# Patient Record
Sex: MALE | Race: BLACK OR AFRICAN AMERICAN | NOT HISPANIC OR LATINO | URBAN - METROPOLITAN AREA
[De-identification: names, ages, dates, MRNs, and addresses within clinical notes are randomized per-mention and may not be internally consistent; named-entity substitution may affect disease eponyms.]

---

## 2021-10-20 ENCOUNTER — OFFICE VISIT (OUTPATIENT)
Dept: URBAN - METROPOLITAN AREA CLINIC 27 | Facility: CLINIC | Age: 46
End: 2021-10-20

## 2021-10-20 PROBLEM — 54586004 LOWER ABDOMINAL PAIN: Status: ACTIVE | Noted: 2021-10-20

## 2021-10-20 PROBLEM — 266464001 HEMORRHAGE OF RECTUM AND ANUS: Status: ACTIVE | Noted: 2021-10-20

## 2021-10-20 PROBLEM — 275978004 SCREENING FOR MALIGNANT NEOPLASM OF COLON: Status: ACTIVE | Noted: 2021-10-20

## 2021-10-20 PROBLEM — 266435005 GASTRO-ESOPHAGEAL REFLUX DISEASE WITHOUT ESOPHAGITIS: Status: ACTIVE | Noted: 2021-10-20

## 2021-10-21 ENCOUNTER — LAB OUTSIDE AN ENCOUNTER (OUTPATIENT)
Dept: URBAN - METROPOLITAN AREA CLINIC 121 | Facility: CLINIC | Age: 46
End: 2021-10-21

## 2021-10-21 LAB
A/G RATIO: (no result)
ALBUMIN: 4.3
ALK PHOS: 132
AMYLASE: 30
BASOPH COUNT: (no result)
BASOPHIL %: 0.7
BG RANDOM: 85
BILI TOTAL: 0.4
BUN/CREAT: (no result)
BUN: 19
CALCIUM: 9.5
CHLORIDE: 103
CO2: 29
CREATININE: 0.92
EOS COUNT: (no result)
EOSINOPHIL %: 4.9
GLOBULIN TOT: (no result)
HCT: 40.7
HGB: 13.5
HGBA1C: (no result)
LYMPHS %: 26
MCH: 30.1
MCHC: (no result)
MCV: 90.8
MONOCYTE %: 10
MONOSCT AUTO: (no result)
PLATELETS: (no result)
PMN %: 58.4
POTASSIUM: 4.5
PROTEIN, TOT: 6.9
RBC: (no result)
RDW: 13.2
SGOT (AST): 20
SGPT (ALT): 20
WBC: (no result)
ZZ-GE-UNK: (no result)
ZZ-GE-UNK: (no result)

## 2022-02-25 ENCOUNTER — OFFICE VISIT (OUTPATIENT)
Dept: URBAN - METROPOLITAN AREA SURGERY CENTER 7 | Facility: SURGERY CENTER | Age: 47
End: 2022-02-25

## 2022-04-01 ENCOUNTER — OFFICE VISIT (OUTPATIENT)
Dept: URBAN - METROPOLITAN AREA CLINIC 27 | Facility: CLINIC | Age: 47
End: 2022-04-01

## 2022-04-01 PROBLEM — 80774000 HELICOBACTER PYLORI: Status: ACTIVE | Noted: 2022-04-01

## 2022-04-01 PROBLEM — 14760008 CONSTIPATION: Status: ACTIVE | Noted: 2022-04-01

## 2022-04-03 ENCOUNTER — OFFICE VISIT (OUTPATIENT)
Dept: URBAN - METROPOLITAN AREA CLINIC 121 | Facility: CLINIC | Age: 47
End: 2022-04-03

## 2022-04-30 ENCOUNTER — TELEPHONE ENCOUNTER (OUTPATIENT)
Dept: URBAN - METROPOLITAN AREA CLINIC 121 | Facility: CLINIC | Age: 47
End: 2022-04-30

## 2022-04-30 RX ORDER — OMEPRAZOLE MAGNESIUM, AMOXICILLIN AND RIFABUTIN 10; 250; 12.5 MG/1; MG/1; MG/1
TAKE 4 CAPSULE BY MOUTH EVERY EIGHT HOURS WITH MEALS CAPSULE, DELAYED RELEASE ORAL
OUTPATIENT
Start: 2022-02-25 | End: 2022-03-11

## 2022-04-30 RX ORDER — OMEPRAZOLE MAGNESIUM, AMOXICILLIN AND RIFABUTIN 10; 250; 12.5 MG/1; MG/1; MG/1
TAKE 4 CAPSULE BY MOUTH EVERY EIGHT HOURS CAPSULE, DELAYED RELEASE ORAL
OUTPATIENT
Start: 2021-10-21 | End: 2021-11-04

## 2022-04-30 RX ORDER — POLYETHYLENE GLYCOL-3350 AND ELECTROLYTES 236; 6.74; 5.86; 2.97; 22.74 G/274.31G; G/274.31G; G/274.31G; G/274.31G; G/274.31G
TAKE BY MOUTH AS DIRECTED MIX AND USE AS DIRECTED. CAN BE SUBSTITUTED WITH GAVILYTE-C, GAVILYTE-N, TRILYTE, OR GENERIC IF NEEDED POWDER, FOR SOLUTION ORAL
OUTPATIENT
Start: 2021-10-20 | End: 2021-10-21

## 2022-04-30 RX ORDER — HYOSCYAMINE SULFATE 0.12 MG/1
TAKE 1-2 TABLET BY MOUTH EVERY SIX HOURS AS NEEDED FOR ABDOMINAL PAIN TABLET ORAL
OUTPATIENT
Start: 2021-10-20 | End: 2022-02-17

## 2022-05-01 ENCOUNTER — TELEPHONE ENCOUNTER (OUTPATIENT)
Dept: URBAN - METROPOLITAN AREA CLINIC 121 | Facility: CLINIC | Age: 47
End: 2022-05-01

## 2023-03-07 ENCOUNTER — TELEPHONE ENCOUNTER (OUTPATIENT)
Dept: URBAN - METROPOLITAN AREA CLINIC 27 | Facility: CLINIC | Age: 48
End: 2023-03-07

## 2023-03-15 ENCOUNTER — TELEPHONE ENCOUNTER (OUTPATIENT)
Dept: URBAN - METROPOLITAN AREA CLINIC 27 | Facility: CLINIC | Age: 48
End: 2023-03-15

## 2023-03-16 ENCOUNTER — TELEPHONE ENCOUNTER (OUTPATIENT)
Dept: URBAN - METROPOLITAN AREA CLINIC 27 | Facility: CLINIC | Age: 48
End: 2023-03-16

## 2023-04-14 ENCOUNTER — DASHBOARD ENCOUNTERS (OUTPATIENT)
Age: 48
End: 2023-04-14

## 2023-04-14 ENCOUNTER — OFFICE VISIT (OUTPATIENT)
Dept: URBAN - METROPOLITAN AREA CLINIC 27 | Facility: CLINIC | Age: 48
End: 2023-04-14
Payer: OTHER GOVERNMENT

## 2023-04-14 ENCOUNTER — WEB ENCOUNTER (OUTPATIENT)
Dept: URBAN - METROPOLITAN AREA CLINIC 27 | Facility: CLINIC | Age: 48
End: 2023-04-14

## 2023-04-14 VITALS
HEIGHT: 72 IN | WEIGHT: 201 LBS | BODY MASS INDEX: 27.22 KG/M2 | SYSTOLIC BLOOD PRESSURE: 124 MMHG | DIASTOLIC BLOOD PRESSURE: 70 MMHG | HEART RATE: 73 BPM

## 2023-04-14 DIAGNOSIS — R13.10 DYSPHAGIA: ICD-10-CM

## 2023-04-14 DIAGNOSIS — K21.9 GERD: ICD-10-CM

## 2023-04-14 DIAGNOSIS — R10.30 LOWER ABDOMINAL PAIN, UNSPECIFIED: ICD-10-CM

## 2023-04-14 DIAGNOSIS — K59.09 CONSTIPATION: ICD-10-CM

## 2023-04-14 PROCEDURE — 99213 OFFICE O/P EST LOW 20 MIN: CPT | Performed by: INTERNAL MEDICINE

## 2023-04-14 NOTE — HPI-TODAY'S VISIT:
Patient here in follow up for multiple GI symptoms >1y. He seems to be doing mostly well at present.  He has occasional reflux symptoms, approximately 2 to 3 times per week.  He is not entirely adherent to an antireflux regimen.  He uses water with baking soda for his symptoms, which is helpful.  He was on ranitidine in the past which was very helpful but stopped taking this when this Rx was recalled a few years ago, and he did not try a different OTC antacid.  He has occasional dysphagia to solids but does not need to regurgitate the food bolus.  He denies significant constipation at present with use of MiraLAX once or twice a day and a fiber supplement.  His water intake seems adequate.  He has not had any recent lower abdominal pain and is no longer taking hyoscyamine.  His last colonoscopy was in early 2022, at which time medium-sized internal hemorrhoids were noted. The remainder of the exam was unremarkable.  He has not had a prior upper endoscopy.  H. pylori breath testing in April of last year was negative.  He was noted to be H. pylori positive in late 2021, and subsequently completed a 2w course of talicia.  Repeat HPBT a few months later was negative.  There is no family history of colon cancer or polyps. He is no longer using naproxen. KUB in 2021 showed moderate constipation. Labs from late 2021 were normal.